# Patient Record
Sex: MALE | Race: WHITE | NOT HISPANIC OR LATINO | Employment: UNEMPLOYED | URBAN - METROPOLITAN AREA
[De-identification: names, ages, dates, MRNs, and addresses within clinical notes are randomized per-mention and may not be internally consistent; named-entity substitution may affect disease eponyms.]

---

## 2018-02-19 ENCOUNTER — TELEPHONE (OUTPATIENT)
Dept: TRANSPLANT | Facility: CLINIC | Age: 48
End: 2018-02-19

## 2018-02-19 NOTE — TELEPHONE ENCOUNTER
----- Message from Jacqueline Dunne sent at 2/19/2018  9:50 AM CST -----  We have the pt recorders, but there are no labs. Ref Md office is in Care Everywhere: Gastroenterology - [facility]    By:Jacqueline Dunne

## 2018-02-20 ENCOUNTER — DOCUMENTATION ONLY (OUTPATIENT)
Dept: TRANSPLANT | Facility: CLINIC | Age: 48
End: 2018-02-20

## 2018-02-20 NOTE — LETTER
February 20, 2018    Doni Flores  [address] [facility] [place] [address]      Dear Doni Flores:    Your doctor has referred you to the [facility]. You will be contacted by our office and an initial appointment will then be scheduled for you.    We look forward to seeing you soon. If you have any further questions, please contact us at [phone].       Sincerely,        [facility]   [address] [place] [address]  [phone]

## 2018-02-20 NOTE — NURSING
Pt records reviewed.  Pt will be referred to Hepatology due to Oliveros Cirr with MELD 7. ? Possible TIPS   Initial referral received  from Barb Baker NP  Referral letter sent to provider and patient.  Pt records reviewed.

## 2018-02-20 NOTE — LETTER
February 20, 2018    Barb Gu, LÁZARO  [address] [facility] [place] [address]      Dear Dr. Gu    Patient: Doni Flores   MR Number: [id number]   YOB: 1970     Thank you for the referral of Doni Flores to the [facility]. An initial appointment will be scheduled for your patient with one of our Hepatologists.      Thank you again for your trust in our program.  If there is anything we can do for you or your staff, please feel free to contact us.        Sincerely,        [facility]  [address] [place] [address]  [phone]

## 2018-03-06 ENCOUNTER — OFFICE VISIT (OUTPATIENT)
Dept: HEPATOLOGY | Facility: CLINIC | Age: 48
End: 2018-03-06
Payer: MEDICARE

## 2018-03-06 VITALS
WEIGHT: 243.81 LBS | DIASTOLIC BLOOD PRESSURE: 67 MMHG | TEMPERATURE: 97 F | SYSTOLIC BLOOD PRESSURE: 151 MMHG | HEIGHT: 74 IN | HEART RATE: 87 BPM | RESPIRATION RATE: 18 BRPM | OXYGEN SATURATION: 98 % | BODY MASS INDEX: 31.29 KG/M2

## 2018-03-06 DIAGNOSIS — K75.81 LIVER CIRRHOSIS SECONDARY TO NASH: Primary | ICD-10-CM

## 2018-03-06 DIAGNOSIS — K76.82 HEPATIC ENCEPHALOPATHY: ICD-10-CM

## 2018-03-06 DIAGNOSIS — K74.60 LIVER CIRRHOSIS SECONDARY TO NASH: Primary | ICD-10-CM

## 2018-03-06 PROBLEM — I50.32 CHRONIC DIASTOLIC HEART FAILURE: Status: ACTIVE | Noted: 2017-07-10

## 2018-03-06 PROBLEM — G89.4 CHRONIC PAIN SYNDROME: Status: ACTIVE | Noted: 2017-09-19

## 2018-03-06 PROBLEM — I48.0 PAROXYSMAL ATRIAL FIBRILLATION: Status: ACTIVE | Noted: 2017-03-20

## 2018-03-06 PROBLEM — D64.9 ANEMIA: Status: ACTIVE | Noted: 2018-01-16

## 2018-03-06 PROBLEM — G93.40 ENCEPHALOPATHY: Status: ACTIVE | Noted: 2017-07-26

## 2018-03-06 PROBLEM — I85.00 ESOPHAGEAL VARICES: Status: ACTIVE | Noted: 2017-02-12

## 2018-03-06 PROCEDURE — 99999 PR PBB SHADOW E&M-EST. PATIENT-LVL IV: CPT | Mod: PBBFAC,,, | Performed by: INTERNAL MEDICINE

## 2018-03-06 PROCEDURE — 99205 OFFICE O/P NEW HI 60 MIN: CPT | Mod: S$PBB,,, | Performed by: INTERNAL MEDICINE

## 2018-03-06 PROCEDURE — 99214 OFFICE O/P EST MOD 30 MIN: CPT | Mod: PBBFAC | Performed by: INTERNAL MEDICINE

## 2018-03-06 RX ORDER — ONDANSETRON 4 MG/1
8 TABLET, FILM COATED ORAL EVERY 8 HOURS PRN
COMMUNITY
End: 2018-10-24

## 2018-03-06 RX ORDER — LEVOTHYROXINE SODIUM 100 UG/1
100 TABLET ORAL DAILY
COMMUNITY

## 2018-03-06 RX ORDER — POTASSIUM CHLORIDE 750 MG/1
20 TABLET, EXTENDED RELEASE ORAL 2 TIMES DAILY
COMMUNITY

## 2018-03-06 RX ORDER — PANTOPRAZOLE SODIUM 40 MG/1
40 TABLET, DELAYED RELEASE ORAL DAILY
COMMUNITY

## 2018-03-06 RX ORDER — DULOXETIN HYDROCHLORIDE 30 MG/1
30 CAPSULE, DELAYED RELEASE ORAL 2 TIMES DAILY
COMMUNITY

## 2018-03-06 RX ORDER — LANOLIN ALCOHOL/MO/W.PET/CERES
400 CREAM (GRAM) TOPICAL DAILY
COMMUNITY

## 2018-03-06 RX ORDER — FLECAINIDE ACETATE 50 MG/1
100 TABLET ORAL EVERY 12 HOURS
COMMUNITY

## 2018-03-06 RX ORDER — LISINOPRIL 10 MG/1
10 TABLET ORAL DAILY
COMMUNITY

## 2018-03-06 RX ORDER — FUROSEMIDE 40 MG/1
40 TABLET ORAL DAILY
Qty: 30 TABLET | Refills: 11 | Status: SHIPPED | OUTPATIENT
Start: 2018-03-06 | End: 2019-03-06

## 2018-03-06 RX ORDER — LANOLIN ALCOHOL/MO/W.PET/CERES
100 CREAM (GRAM) TOPICAL DAILY
COMMUNITY

## 2018-03-06 RX ORDER — FAMOTIDINE 40 MG/1
40 TABLET, FILM COATED ORAL DAILY
COMMUNITY

## 2018-03-06 RX ORDER — ZOLPIDEM TARTRATE 10 MG/1
5 TABLET ORAL NIGHTLY PRN
COMMUNITY
End: 2018-10-24

## 2018-03-06 RX ORDER — SPIRONOLACTONE 100 MG/1
100 TABLET, FILM COATED ORAL DAILY
Qty: 30 TABLET | Refills: 11 | Status: SHIPPED | OUTPATIENT
Start: 2018-03-06 | End: 2019-03-06

## 2018-03-06 RX ORDER — FUROSEMIDE 20 MG/1
20 TABLET ORAL DAILY
COMMUNITY
End: 2018-03-06 | Stop reason: SDUPTHER

## 2018-03-06 RX ORDER — METOPROLOL SUCCINATE 100 MG/1
100 TABLET, EXTENDED RELEASE ORAL DAILY
COMMUNITY

## 2018-03-06 RX ORDER — TIZANIDINE 4 MG/1
4 TABLET ORAL EVERY 8 HOURS
COMMUNITY

## 2018-03-06 RX ORDER — OXYCODONE HCL 10 MG/1
10 TABLET, FILM COATED, EXTENDED RELEASE ORAL EVERY 12 HOURS PRN
COMMUNITY

## 2018-03-06 RX ORDER — FERROUS SULFATE 324(65)MG
325 TABLET, DELAYED RELEASE (ENTERIC COATED) ORAL 2 TIMES DAILY
COMMUNITY

## 2018-03-06 RX ORDER — GABAPENTIN 800 MG/1
800 TABLET ORAL 3 TIMES DAILY
COMMUNITY

## 2018-03-06 NOTE — LETTER
March 6, 2018      Barb Gu NP at [facility] - Hepatology  [address]  [place] [address]  Phone: [phone]  Fax: [phone]          Patient: Doni Flores   MR Number: [id number]   YOB: 1970   Date of Visit: 3/6/2018       Dear Barb Gu:    Thank you for referring Doni Flores to me for evaluation. Attached you will find relevant portions of my assessment and plan of care.    If you have questions, please do not hesitate to call me. I look forward to following Doni Flores along with you.    Sincerely,    Celestina Baires MD    Enclosure  CC:  No Recipients    If you would like to receive this communication electronically, please contact [email or web address] or [phone] to request more information on [employer] Link access.    For providers and/or their staff who would like to refer a patient to [facility], please contact us through our one-stop-shop provider referral line, [facility], at [phone].    If you feel you have received this communication in error or would no longer like to receive these types of communications, please e-mail [email or web address]

## 2018-03-06 NOTE — PATIENT INSTRUCTIONS
You are not a good candidate for TIPS because of confusion.  We will proceed with transplant evaluation at this time.    We will ask for financial approval for transplant.      You should only have TIPS if you have bleeding that cannot be stopped.  This would be an emergency procedure.    Recommend establishing care for pain management.     Increase lasix 40mg daily (you may take two 20mg tablets until you run out).  And we will add spironolactone.  When you start spironolactone, you can stop potassium supplements.      Return to 3 months

## 2018-03-06 NOTE — PROGRESS NOTES
Hepatology Consult Note    Referring provider: Barb Gu    Chief complaint:   Chief Complaint   Patient presents with    Cirrhosis       HPI:  Doni Flores is a 47 y.o. male that presents to hepatology clinic for consultation of HACKETT cirrhosis and consideration of TIPS.  He is alone.    The patient reports being diagnosed with liver disease many years ago but does not recall symptoms or testing that prompted diagnosis.  He believes he has been diagnosed with cirrhosis for approximately 15 years.  Outside records report presumed HACKETT cirrhosis based on risk factors.  States that he was previously 365 lbs but has lost 115 lbs over the last 6 months.      The patient has had multiple hospitalizations related to liver disease and complications of portal hypertension.  He has had variceal and portal hypertensive bleeding that have required multiple blood transfusions.  The patient reports 39 units of blood over the past year.  Care Everywhere reviewed and there is recurring admissions for GI bleeding.  The patient also has HE and most recently was hospitalized 2 weeks ago for encephalopathy.  He was treated with lactulose and rifaximin.  He reports taking lactulose for 2-3 bowel movements daily but is not currently on rifaximin.  Volume overload with LE edema but only on furosemide 20mg daily with potassium supplementation.  He is not aware of being on higher doses of diuretic therapy or aldactone in the past. The patient reports compliance with low sodium diet.      The patient's liver disease has severely negatively impacted his life.  He is currently living with his brother and mother.  He has had many hospitalizations over the last year, with at least one per month.      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    Severe episode of recurrent major depressive disorder    Mixed hyperlipidemia    Pancytopenia    Paroxysmal atrial fibrillation    Peripheral neuropathy    Primary osteoarthritis involving multiple joints    Poorly controlled type 2 diabetes mellitus with peripheral neuropathy       PMH:    CAD - reported 20% LAD lesion, normal stress test 2017  Depression  DM - about 20 years   Prior obesity (weight up to 365 lbs)  GERD  Hypothyroid  Depression   Opioid abuse - previously seen by pain management but not currently on medication  Previously on suboxone   Atrial fibrillation (prior cardioversion x 1), not currently in a fib; stopped anticoagulation due to recurrent GI bleeding   Peripheral neuropathy    PSH:   Carpal tunnel   Back surgery  Foot surgery  Leg surgery (infected with woundvac)  Cholecystectomy - open     FH: no liver disease     Social History     Social History    Marital status:      Spouse name: N/A    Number of children: N/A    Years of education: N/A     Social History Main Topics    Smoking status: Former Smoker     Types: Cigarettes     Quit date: 3/6/1998    Smokeless tobacco: None    Alcohol use No    Drug use: Unknown    Sexual activity: Not Asked     Other Topics Concern    None     Social History Narrative    None   lives with mother and brother; quit smoking in 1997, quit alcohol 15-20 years ago when diagnosed with liver disease  No illicit drug use     Current Outpatient Prescriptions   Medication Sig Dispense Refill    cyanocobalamin (VITAMIN B-12) 1000 MCG tablet Take 100 mcg by mouth once daily.      DULoxetine (CYMBALTA) 30 MG capsule Take 30 mg by mouth 2 (two) times daily.      famotidine (PEPCID) 40 MG tablet Take 40 mg by mouth once daily.      ferrous sulfate 324 mg (65 mg iron) TbEC Take 325 mg by  mouth 2 (two) times daily.      flecainide (TAMBOCOR) 50 MG Tab Take 100 mg by mouth every 12 (twelve) hours.      furosemide (LASIX) 20 MG tablet Take 20 mg by mouth once daily.      gabapentin (NEURONTIN) 800 MG tablet Take 800 mg by mouth 3 (three) times daily.      insulin NPH-insulin regular, 70/30, (NOVOLIN 70/30) 100 unit/mL (70-30) injection Inject into the skin 2 (two) times daily. 30 units nightly      levothyroxine (SYNTHROID) 100 MCG tablet Take 100 mcg by mouth once daily.      lisinopril 10 MG tablet Take 10 mg by mouth once daily.      magnesium oxide (MAG-OX) 400 mg tablet Take 400 mg by mouth once daily.      metoprolol succinate (TOPROL-XL) 100 MG 24 hr tablet Take 100 mg by mouth once daily.      ondansetron (ZOFRAN) 4 MG tablet Take 8 mg by mouth every 8 (eight) hours as needed for Nausea.      oxyCODONE (OXYCONTIN) 10 mg 12 hr tablet Take 10 mg by mouth every 12 (twelve) hours as needed for Pain.      pantoprazole (PROTONIX) 40 MG tablet Take 40 mg by mouth once daily.      potassium chloride SA (K-DUR,KLOR-CON) 10 MEQ tablet Take 20 mEq by mouth 2 (two) times daily.      tiZANidine (ZANAFLEX) 4 MG tablet Take 4 mg by mouth every 8 (eight) hours.      zolpidem (AMBIEN) 10 mg Tab Take 5 mg by mouth nightly as needed.       No current facility-administered medications for this visit.        Review of patient's allergies indicates:   Allergen Reactions    Ketorolac tromethamine Palpitations    Tramadol hcl Hives    Cyclobenzaprine hcl Other (See Comments)     Unable to void    Ibuprofen Itching       Review of Systems   Constitutional: Positive for malaise/fatigue and weight loss. Negative for chills and fever.   Eyes: Negative.    Respiratory: Negative for cough and shortness of breath.    Cardiovascular: Positive for leg swelling. Negative for chest pain.   Gastrointestinal: Positive for heartburn and nausea. Negative for abdominal pain and vomiting.   Musculoskeletal: Positive  "for back pain and joint pain. Negative for myalgias.   Skin: Negative for itching and rash.   Neurological: Positive for weakness. Negative for dizziness, focal weakness and headaches.   Endo/Heme/Allergies: Does not bruise/bleed easily.   Psychiatric/Behavioral: Positive for depression and memory loss.       Vitals:    03/06/18 1358   BP: (!) 151/67   Pulse: 87   Resp: 18   Temp: 96.9 °F (36.1 °C)   TempSrc: Oral   SpO2: 98%   Weight: 110.6 kg (243 lb 13.3 oz)   Height: 6' 2" (1.88 m)       Physical Exam   Constitutional: He is oriented to person, place, and time. He appears well-developed and well-nourished. No distress.   HENT:   Head: Normocephalic and atraumatic.   Mouth/Throat: Oropharynx is clear and moist. No oropharyngeal exudate.   Eyes: Conjunctivae are normal. Pupils are equal, round, and reactive to light. No scleral icterus.   Neck: Normal range of motion. Neck supple. No thyromegaly present.   Cardiovascular: Normal rate, regular rhythm and normal heart sounds.  Exam reveals no gallop and no friction rub.    No murmur heard.  No evidence of a fib   Pulmonary/Chest: Effort normal and breath sounds normal. No respiratory distress. He has no wheezes. He has no rales.   Abdominal: Soft. Bowel sounds are normal. He exhibits no distension. There is tenderness.   Musculoskeletal: Normal range of motion. He exhibits edema (2+ pitting edema).   Lymphadenopathy:     He has no cervical adenopathy.   Neurological: He is alert and oriented to person, place, and time. No cranial nerve deficit.   Asterixis present   Skin: Skin is warm and dry. No erythema.   Psychiatric: He has a normal mood and affect. His behavior is normal.   Slowed speech and mentation   Vitals reviewed.    Labs:    External labs reviewed in Care Everywhere, MELD 7    Imaging: External imaging reviewed; contrasted CT in August and October 2017     Assessment:  47 y.o. male presenting with decompensated HACKETT cirrhosis characterized by recurrent " GI bleeding, HE and ascites formation.  Consultation for consideration of TIPS.      Recurrent GI bleeding:  The patient is not a good candidate for TIPS based on presence of HE.  The patient has required recent hospitalization for HE and is currently on lactulose for management.  He was also treated with rifaximin while inpatient but reports that he is not currently on the medication.  Will order through [facility] to obtain PA and possibly patient assistance if required.  TIPS will make HE much worse and therefore would only pursue if patient presented with portal hypertensive bleeding that could not be managed endoscopically.      Volume overload:  Patient is on low dose diuretics.  Increase furosemide to 40mg daily and discontinue potassium supplementation.  He should initiate spironolactone 100mg daily.  Repeat labs locally in 2 weeks to f/u electrolytes and renal function with dose change.  Patient encouraged to continue low sodium diet.    Transplant candidacy:  Based on frequent hospitalizations and inability to treat recurrent bleeding with TIPS due to HE, recommend initiating transplant evaluation.  The patient has no absolute contraindications but is noted to have multiple referrals to opiate and benzo abuse along with drug seeking behavior.  He did also ask for narcotic pain medications during his office visit, which was not given.  The patient will need addiction psychiatry evaluation along with initiation of external pain management with transplant evaluation.  If patient is deemed an appropriate candidate, he will require exception points based on frequent hospitalizations and life-threatening bleeding despite appropriate medical management.       RTC in 3 months with transplant evaluation in the interim and repeat labs in 2 weeks

## 2018-03-07 ENCOUNTER — TELEPHONE (OUTPATIENT)
Dept: TRANSPLANT | Facility: CLINIC | Age: 48
End: 2018-03-07

## 2018-03-07 NOTE — TELEPHONE ENCOUNTER
Initial referral received via fax from  Barb Baker NP  Patient with HACKETT cirrhosis. Based on frequent hospitalizations and inability to treat recurrent bleeding with TIPS due to HE, recommend initiating transplant evaluation.    MELD 7  Referred for liver transplant for EVALUATION.    Referral completed and forwarded to Transplant Financial Services.      Insurance: EPIC  Contact #

## 2018-03-13 ENCOUNTER — TELEPHONE (OUTPATIENT)
Dept: TRANSPLANT | Facility: CLINIC | Age: 48
End: 2018-03-13

## 2018-03-13 NOTE — TELEPHONE ENCOUNTER
Pt informed of clr and need for care giver at all appts. Pt instructed Rhoda will call him within 1-2 weeks for appt.

## 2018-03-16 ENCOUNTER — TELEPHONE (OUTPATIENT)
Dept: PHARMACY | Facility: CLINIC | Age: 48
End: 2018-03-16

## 2018-03-22 DIAGNOSIS — K76.9 LIVER DISEASE: Primary | ICD-10-CM

## 2018-03-23 ENCOUNTER — TELEPHONE (OUTPATIENT)
Dept: TRANSPLANT | Facility: CLINIC | Age: 48
End: 2018-03-23

## 2018-03-26 ENCOUNTER — TELEPHONE (OUTPATIENT)
Dept: HEPATOLOGY | Facility: CLINIC | Age: 48
End: 2018-03-26

## 2018-03-26 DIAGNOSIS — K75.81 LIVER CIRRHOSIS SECONDARY TO NASH: Primary | ICD-10-CM

## 2018-03-26 DIAGNOSIS — Z76.82 AWAITING ORGAN TRANSPLANT STATUS: ICD-10-CM

## 2018-03-26 DIAGNOSIS — K74.60 LIVER CIRRHOSIS SECONDARY TO NASH: Primary | ICD-10-CM

## 2018-03-26 DIAGNOSIS — K76.82 HEPATIC ENCEPHALOPATHY: ICD-10-CM

## 2018-03-26 NOTE — TELEPHONE ENCOUNTER
Patient contacted coordinator on Thursday March 22nd for urgent appointment.  Reported severe abdominal pain and requests for pain medication.  Through discussion with patient's sister, informed that the patient has had a recent hospitalization for AMS in the setting of using non-prescribed medications including narcotics.  Found to have a supply of non-prescribed medications in his room and there is significant concern regarding abuse of these medications.  This has not been addressed with patient and will need to be seen in clinic for further evaluation and discussion.    If confirmed that the patient is using non-prescribed medications and exhibiting narcotic abuse, not appropriate to move forward with transplant evaluation at this time.  He will need psychiatry and rehab services prior to proceeding.

## 2018-03-28 NOTE — TELEPHONE ENCOUNTER
Received phone call from patient's sister, Altagracia Castañeda ([phone]).  She reports that patient has been admitted twice locally recently.  She reports that patient was in [facility] in [place].....in ICU x2 days.  Patient admitted for HE.  Patient dc'd from the hospital within a few days.  Patient was doing fair but within a week or so after dc, patient became confused.  Family thought patient was encephalopathic so instructed him to take lactulose.  Patient's symptoms worsened throughout the day and patient became unresponsive.  Patient taken to [facility].  While patient in hospital, the family found prescribed medications that patient bought from the street, including pain pills, xanax, and other unidentified pills.  Sister also noted that patient is overusing muscle relaxers.  She reports that she called to inform us above because she feels as though patient will not likely be honest w/ transplant dept.  Will inform pt's hepatologist of above  ---------------------------------------------------------------------------------  3/22/18  Received phone call from patient.  Patient dc'd from the hospital.  He is calling w/ c/o severe abdominal pain.  Denies any other symptoms.  Instructed patient to report to ED.  He reports that he has been seen in ED but has not been given any pain pills.  Patient requesting that MD here call in a refill for pain.  Informed patient that this will not be done.  Informed patient that he must first be assessed in clinic and is likely that pain medication will still not be prescribed.  He voiced understanding but requests a f/u visit.  Per his request, urgent appt scheduled for 3/23 in the transplant clinic.  Instructed patient to report to ED for worsening pain.  ---------------------------------------------------------------------------------------  3/23/18  Patient no-showed for clinic appt.  Attempted to call patient but no answer at this  time.  ---------------------------------------------------------------------------------------  3/28/18  Called patient to discuss missed appt.  He reports that he did not come in for scheduled appts d/t no transportation.  He reports that he has since rescheduled his appts and has his transportation coordinated.  He reports that he continues to have pain but describes it as chronic.  Once again, instructed him to report to his local ED for worsening pain.  He voiced understanding.

## 2018-04-11 ENCOUNTER — TELEPHONE (OUTPATIENT)
Dept: TRANSPLANT | Facility: CLINIC | Age: 48
End: 2018-04-11

## 2018-04-11 NOTE — TELEPHONE ENCOUNTER
----- Message from Jacqueline Dunne sent at 4/11/2018  9:14 AM CDT -----  Contact: Pt  Returned pt call and told him Dr. Baires next available appt is May 10. Re/[facility] pt appts for may 10 and will mail him out a new appt slip.  ----- Message -----  From: Mikey Chua  Sent: 4/11/2018   8:35 AM  To: [facility] Pre-Liver Transplant Non-Clinical    Pt unable to make appt, he would like to come in next Thursday or Friday.     Call

## 2018-05-10 ENCOUNTER — OFFICE VISIT (OUTPATIENT)
Dept: TRANSPLANT | Facility: CLINIC | Age: 48
End: 2018-05-10
Payer: MEDICARE

## 2018-05-10 ENCOUNTER — LAB VISIT (OUTPATIENT)
Dept: LAB | Facility: HOSPITAL | Age: 48
End: 2018-05-10
Attending: INTERNAL MEDICINE
Payer: MEDICARE

## 2018-05-10 ENCOUNTER — TELEPHONE (OUTPATIENT)
Dept: TRANSPLANT | Facility: CLINIC | Age: 48
End: 2018-05-10

## 2018-05-10 VITALS
HEIGHT: 72 IN | BODY MASS INDEX: 31.36 KG/M2 | RESPIRATION RATE: 16 BRPM | OXYGEN SATURATION: 100 % | HEART RATE: 64 BPM | DIASTOLIC BLOOD PRESSURE: 67 MMHG | TEMPERATURE: 98 F | WEIGHT: 231.5 LBS | SYSTOLIC BLOOD PRESSURE: 116 MMHG

## 2018-05-10 DIAGNOSIS — Z76.82 AWAITING ORGAN TRANSPLANT STATUS: ICD-10-CM

## 2018-05-10 DIAGNOSIS — K74.60 LIVER CIRRHOSIS SECONDARY TO NASH: ICD-10-CM

## 2018-05-10 DIAGNOSIS — K75.81 LIVER CIRRHOSIS SECONDARY TO NASH: ICD-10-CM

## 2018-05-10 DIAGNOSIS — Z76.82 ORGAN TRANSPLANT CANDIDATE: Primary | ICD-10-CM

## 2018-05-10 LAB
ALBUMIN SERPL BCP-MCNC: 3.2 G/DL
ALP SERPL-CCNC: 216 U/L
ALT SERPL W/O P-5'-P-CCNC: 18 U/L
ANION GAP SERPL CALC-SCNC: 11 MMOL/L
AST SERPL-CCNC: 26 U/L
BASOPHILS # BLD AUTO: 0.01 K/UL
BASOPHILS NFR BLD: 0.5 %
BILIRUB SERPL-MCNC: 0.4 MG/DL
BUN SERPL-MCNC: 21 MG/DL
CALCIUM SERPL-MCNC: 9.6 MG/DL
CHLORIDE SERPL-SCNC: 93 MMOL/L
CO2 SERPL-SCNC: 24 MMOL/L
CREAT SERPL-MCNC: 1.3 MG/DL
DIFFERENTIAL METHOD: ABNORMAL
EOSINOPHIL # BLD AUTO: 0.1 K/UL
EOSINOPHIL NFR BLD: 5 %
ERYTHROCYTE [DISTWIDTH] IN BLOOD BY AUTOMATED COUNT: 15.7 %
EST. GFR  (AFRICAN AMERICAN): >60 ML/MIN/1.73 M^2
EST. GFR  (NON AFRICAN AMERICAN): >60 ML/MIN/1.73 M^2
GLUCOSE SERPL-MCNC: 378 MG/DL
HCT VFR BLD AUTO: 31.1 %
HGB BLD-MCNC: 9.9 G/DL
IMM GRANULOCYTES # BLD AUTO: 0.01 K/UL
IMM GRANULOCYTES NFR BLD AUTO: 0.5 %
INR PPP: 1
LYMPHOCYTES # BLD AUTO: 0.6 K/UL
LYMPHOCYTES NFR BLD: 27.1 %
MCH RBC QN AUTO: 25.6 PG
MCHC RBC AUTO-ENTMCNC: 31.8 G/DL
MCV RBC AUTO: 80 FL
MONOCYTES # BLD AUTO: 0.2 K/UL
MONOCYTES NFR BLD: 11 %
NEUTROPHILS # BLD AUTO: 1.2 K/UL
NEUTROPHILS NFR BLD: 55.9 %
NRBC BLD-RTO: 0 /100 WBC
PLATELET # BLD AUTO: 93 K/UL
PMV BLD AUTO: 12.1 FL
POTASSIUM SERPL-SCNC: 4.3 MMOL/L
PROT SERPL-MCNC: 7.9 G/DL
PROTHROMBIN TIME: 10.9 SEC
RBC # BLD AUTO: 3.87 M/UL
SODIUM SERPL-SCNC: 128 MMOL/L
WBC # BLD AUTO: 2.18 K/UL

## 2018-05-10 PROCEDURE — 85025 COMPLETE CBC W/AUTO DIFF WBC: CPT | Mod: TXP

## 2018-05-10 PROCEDURE — 85610 PROTHROMBIN TIME: CPT | Mod: TXP

## 2018-05-10 PROCEDURE — 99999 PR PBB SHADOW E&M-EST. PATIENT-LVL V: CPT | Mod: PBBFAC,TXP,, | Performed by: INTERNAL MEDICINE

## 2018-05-10 PROCEDURE — 80053 COMPREHEN METABOLIC PANEL: CPT | Mod: TXP

## 2018-05-10 PROCEDURE — 36415 COLL VENOUS BLD VENIPUNCTURE: CPT | Mod: TXP

## 2018-05-10 PROCEDURE — 99215 OFFICE O/P EST HI 40 MIN: CPT | Mod: S$PBB,TXP,, | Performed by: INTERNAL MEDICINE

## 2018-05-10 PROCEDURE — 99215 OFFICE O/P EST HI 40 MIN: CPT | Mod: PBBFAC,TXP | Performed by: INTERNAL MEDICINE

## 2018-05-10 RX ORDER — INSULIN ASPART 100 [IU]/ML
35 INJECTION, SOLUTION INTRAVENOUS; SUBCUTANEOUS 3 TIMES DAILY
COMMUNITY
Start: 2018-01-18

## 2018-05-10 RX ORDER — LACTULOSE 10 G/15ML
30 SOLUTION ORAL 3 TIMES DAILY
COMMUNITY
Start: 2018-03-14

## 2018-05-10 NOTE — PATIENT INSTRUCTIONS
We can not proceed with liver transplant at this time.    It is important to establish care with a psychiatrist and a pain management specialist.    You would benefit from outpatient physical therapy.    Important to have better control of blood sugar.  You should work with your primary care doctor.    It is important to limit fluid intake to less than 2 liters per day.      You will return for hepatology follow-up in 3 months.

## 2018-05-10 NOTE — TELEPHONE ENCOUNTER
----- Message from Fabiola Oliva LCSW sent at 5/10/2018  2:31 PM CDT -----  This patient does not have a working number. I will mail him a letter with recommended resources. I will state in the letter that patient will need to complete substance abuse treatment and follow up with aftercare in order to be considered for liver transplant eval.     Thanks.   ----- Message -----  From: Celestina Baires MD  Sent: 5/10/2018   1:10 PM  To: [facility] Trans[facility] Social Workers    This patient is being declined for transplant due to drug seeking behavior and narcotic abuse.  Can we provide local addiction psychiatry resources?  I'm also asking the patient's PCP to coordinate pain management.      Thanks, CB

## 2018-05-10 NOTE — Clinical Note
This patient is being declined for transplant due to drug seeking behavior and narcotic abuse.  Can we provide local addiction psychiatry resources?  I'm also asking the patient's PCP to coordinate pain management.    Thanks, CB

## 2018-05-10 NOTE — PROGRESS NOTES
Transplant Hepatology  Liver Transplant Recipient Evaluation      Consultation started: 5/10/2018 at 10:55 AM     Original Referring Provider: Barb Gu  Current Corresponding Physician: Barb PARSON Native Liver Diagnosis: Cirrhosis: Fatty Liver (Oliveros)    Reason for Visit: evaluation for liver transplant     Subjective:     Doni Flores is a 47 y.o. male with ESLD secondary to OLIVEROS (non-alcoholic steatohepatitis).  He is alone.     Last office visit of 3/6/2018.  The patient has had multiple local hospitalizations since his last office visit.  He reports that they are due to recurrent GI bleeding and volume overload. External records reviewed and the patient appears to have 4 documented hospitalizations in Care Everywhere.  Most recent from 4/13/18 is hematemesis related to variceal bleeding requiring intubation.  The patient had a hemoglobin down to 6.8.  Received blood transfusion along with endoscopy and banding.  The patient has also had ED visits with documentation of no significant biochemical or clinical abnormalities with drug seeking behavior.       The patient states that he continues to have issues with intermittent GI bleeding, HE, and volume overload.  He reports taking lasix 40mg and spironolactone 100mg daily.  He is also on lactulose and rifaximin.    The patient is known to have been taking non-prescribed medications as well as seeking narcotics from multiple providers.  This was discussed today.      Review of Systems   Constitutional: Positive for fatigue. Negative for activity change, appetite change, chills and unexpected weight change.   HENT: Negative for hearing loss and sore throat.    Eyes: Negative for visual disturbance.   Respiratory: Negative for shortness of breath.    Cardiovascular: Negative for chest pain.   Gastrointestinal: Negative for abdominal distention, abdominal pain, nausea and vomiting.   Musculoskeletal: Positive for back pain. Negative for gait  problem.   Skin: Negative for rash.   Neurological: Negative for weakness and headaches.   Hematological: Negative for adenopathy. Does not bruise/bleed easily.   Psychiatric/Behavioral: Positive for confusion.       Objective:   Physical Exam     MELD-Na score: 9 at 5/10/2018  8:50 AM  MELD score: 9 at 5/10/2018  8:50 AM  Calculated from:  Serum Creatinine: 1.3 mg/dL at 5/10/2018  8:50 AM  Serum Sodium: 128 mmol/L at 5/10/2018  8:50 AM  Total Bilirubin: 0.4 mg/dL (Rounded to 1) at 5/10/2018  8:50 AM  INR(ratio): 1 at 5/10/2018  8:50 AM  Age: 47 years     Lab Results   Component Value Date     (H) 05/10/2018    BUN 21 (H) 05/10/2018    CREATININE 1.3 05/10/2018    CALCIUM 9.6 05/10/2018     (L) 05/10/2018    K 4.3 05/10/2018    CL 93 (L) 05/10/2018    PROT 7.9 05/10/2018    CO2 24 05/10/2018    WBC 2.18 (L) 05/10/2018    RBC 3.87 (L) 05/10/2018    HGB 9.9 (L) 05/10/2018    HCT 31.1 (L) 05/10/2018    PLT 93 (L) 05/10/2018     Lab Results   Component Value Date    ALBUMIN 3.2 (L) 05/10/2018    BILITOT 0.4 05/10/2018    AST 26 05/10/2018    ALT 18 05/10/2018    ALKPHOS 216 (H) 05/10/2018    LABPROT 10.9 05/10/2018    INR 1.0 05/10/2018       Diagnostics: EMR reviewed     Transplant Hepatology - Candidacy   Assessment/Plan:     Transplant Candidacy: Doni Flores is a 47 y.o. male with ESLD secondary to nonalcoholic steatohepatitis here for evaluation for possible OLT.  In my opinion, he is not a good candidate for liver transplant.  He will not be presented to selection committee after all tests and evaluations are complete.    While the patient has objective evidence of decompensated liver disease, the patient is showing escalation of drug-seeking behavior and has not been honest about use.  This has been directly confirmed as previously documented.  Therefore we cannot proceed with transplant evaluation.  This was explicitly discussed and patient was recommended to engage with addiction psychiatry and pain  management locally to address this issue.  Only once there has been clear documentation that the patient has actively engaged in services and is compliant with recommendations can reconsideration of transplant evaluation occur.    Will ask social work to provide resources for local psychiatry.  He reports that phone number needs to be updated and he will contact transplant office with new cell phone number.      Patient instructed to f/u with PCP regarding pain management and blood glucose management.      Hyponatremia:  1.5-2L fluid restriction, continue current dosing of diuretics.  Continue low sodium diet for volume overload.     Decreased functional status:  Patient would benefit from local HH or outpatient PT.      PCP: Dr. Erik Baires MD         [facility] Patient Status  Functional Status: 50% - Requires considerable assistance and frequent medical care  Physical Capacity: Limited Mobility    Outside Records Request: none

## 2018-05-10 NOTE — LETTER
May 10, 2018        Barb Gu  [address]  [facility] [place] [address]  Phone: [phone]  Fax: [phone]             [facility]  [address]  [facility] [address]  Phone: [phone]   Patient: Doni Flores   MR Number: [id number]   YOB: 1970   Date of Visit: 5/10/2018       Dear Dr. Barb Gu    Thank you for referring Doni Flores to me for evaluation. Attached you will find relevant portions of my assessment and plan of care.    If you have questions, please do not hesitate to call me. I look forward to following Doni Flores along with you.    Sincerely,    Celestina Baires MD    Enclosure    If you would like to receive this communication electronically, please contact [email or web address] or [phone] to request [employer] access.    [employer] Link is a tool which provides read-only access to select patient information with whom you have a relationship. Its easy to use and provides real time access to review your patients record including encounter summaries, notes, results, and demographic information.    If you feel you have received this communication in error or would no longer like to receive these types of communications, please e-mail [email or web address]

## 2018-05-10 NOTE — TELEPHONE ENCOUNTER
SW attempted to reach out to patient via his cell phone ([phone]) but was unable to reach him as this is not a working number. LINDA emailed letter to pt's address stating that pt will need to complete formal substance abuse treatment and enroll in aftercare to address substance abuse. LINDA provided three local resources in the letter. SW include name and contact number in letter in case pt has any follow up questions. SW remains available.

## 2018-05-15 NOTE — PROGRESS NOTES
Patient declined for liver transplant d/t ongoing narcotic abuse.  [place] transplant episode resolved.  Patient to start rehab/ therapy and RTC in three months to be seen by Dr. Baires in hepatology clinic.  Hepatology staff notified and recall entered.

## 2018-07-26 DIAGNOSIS — K75.81 LIVER CIRRHOSIS SECONDARY TO NASH: Primary | ICD-10-CM

## 2018-07-26 DIAGNOSIS — K74.60 LIVER CIRRHOSIS SECONDARY TO NASH: Primary | ICD-10-CM

## 2018-10-24 ENCOUNTER — OFFICE VISIT (OUTPATIENT)
Dept: HEPATOLOGY | Facility: CLINIC | Age: 48
End: 2018-10-24
Payer: MEDICARE

## 2018-10-24 ENCOUNTER — TELEPHONE (OUTPATIENT)
Dept: HEPATOLOGY | Facility: CLINIC | Age: 48
End: 2018-10-24

## 2018-10-24 ENCOUNTER — LAB VISIT (OUTPATIENT)
Dept: LAB | Facility: HOSPITAL | Age: 48
End: 2018-10-24
Attending: INTERNAL MEDICINE
Payer: MEDICARE

## 2018-10-24 VITALS
WEIGHT: 231.5 LBS | DIASTOLIC BLOOD PRESSURE: 91 MMHG | HEART RATE: 105 BPM | HEIGHT: 74 IN | BODY MASS INDEX: 29.71 KG/M2 | RESPIRATION RATE: 18 BRPM | SYSTOLIC BLOOD PRESSURE: 194 MMHG | TEMPERATURE: 98 F | OXYGEN SATURATION: 98 %

## 2018-10-24 DIAGNOSIS — K74.60 LIVER CIRRHOSIS SECONDARY TO NASH: Primary | ICD-10-CM

## 2018-10-24 DIAGNOSIS — R73.9 HYPERGLYCEMIA: ICD-10-CM

## 2018-10-24 DIAGNOSIS — K76.82 HEPATIC ENCEPHALOPATHY: ICD-10-CM

## 2018-10-24 DIAGNOSIS — K75.81 LIVER CIRRHOSIS SECONDARY TO NASH: ICD-10-CM

## 2018-10-24 DIAGNOSIS — K74.60 LIVER CIRRHOSIS SECONDARY TO NASH: ICD-10-CM

## 2018-10-24 DIAGNOSIS — K75.81 LIVER CIRRHOSIS SECONDARY TO NASH: Primary | ICD-10-CM

## 2018-10-24 LAB
AFP SERPL-MCNC: 4.1 NG/ML
ALBUMIN SERPL BCP-MCNC: 3.4 G/DL
ALP SERPL-CCNC: 442 U/L
ALT SERPL W/O P-5'-P-CCNC: 39 U/L
ANION GAP SERPL CALC-SCNC: 13 MMOL/L
AST SERPL-CCNC: 38 U/L
BASOPHILS # BLD AUTO: 0.02 K/UL
BASOPHILS NFR BLD: 0.8 %
BILIRUB SERPL-MCNC: 0.5 MG/DL
BUN SERPL-MCNC: 23 MG/DL
CALCIUM SERPL-MCNC: 9.8 MG/DL
CHLORIDE SERPL-SCNC: 96 MMOL/L
CO2 SERPL-SCNC: 19 MMOL/L
CREAT SERPL-MCNC: 1.6 MG/DL
DIFFERENTIAL METHOD: ABNORMAL
EOSINOPHIL # BLD AUTO: 0 K/UL
EOSINOPHIL NFR BLD: 1.5 %
ERYTHROCYTE [DISTWIDTH] IN BLOOD BY AUTOMATED COUNT: 16.6 %
EST. GFR  (AFRICAN AMERICAN): 57.2 ML/MIN/1.73 M^2
EST. GFR  (NON AFRICAN AMERICAN): 49.5 ML/MIN/1.73 M^2
GLUCOSE SERPL-MCNC: 939 MG/DL
HCT VFR BLD AUTO: 36.2 %
HGB BLD-MCNC: 11.5 G/DL
IMM GRANULOCYTES # BLD AUTO: 0.01 K/UL
IMM GRANULOCYTES NFR BLD AUTO: 0.4 %
INR PPP: 1
LYMPHOCYTES # BLD AUTO: 0.5 K/UL
LYMPHOCYTES NFR BLD: 18.8 %
MCH RBC QN AUTO: 25.9 PG
MCHC RBC AUTO-ENTMCNC: 31.8 G/DL
MCV RBC AUTO: 82 FL
MONOCYTES # BLD AUTO: 0.3 K/UL
MONOCYTES NFR BLD: 10.9 %
NEUTROPHILS # BLD AUTO: 1.8 K/UL
NEUTROPHILS NFR BLD: 67.6 %
NRBC BLD-RTO: 0 /100 WBC
PLATELET # BLD AUTO: 80 K/UL
PMV BLD AUTO: 12.2 FL
POTASSIUM SERPL-SCNC: 5.2 MMOL/L
PROT SERPL-MCNC: 8.6 G/DL
PROTHROMBIN TIME: 10.7 SEC
RBC # BLD AUTO: 4.44 M/UL
SODIUM SERPL-SCNC: 128 MMOL/L
WBC # BLD AUTO: 2.66 K/UL

## 2018-10-24 PROCEDURE — 99215 OFFICE O/P EST HI 40 MIN: CPT | Mod: PBBFAC | Performed by: INTERNAL MEDICINE

## 2018-10-24 PROCEDURE — 36415 COLL VENOUS BLD VENIPUNCTURE: CPT

## 2018-10-24 PROCEDURE — 99215 OFFICE O/P EST HI 40 MIN: CPT | Mod: S$PBB,,, | Performed by: INTERNAL MEDICINE

## 2018-10-24 PROCEDURE — 85610 PROTHROMBIN TIME: CPT

## 2018-10-24 PROCEDURE — 82105 ALPHA-FETOPROTEIN SERUM: CPT

## 2018-10-24 PROCEDURE — 99999 PR PBB SHADOW E&M-EST. PATIENT-LVL V: CPT | Mod: PBBFAC,,, | Performed by: INTERNAL MEDICINE

## 2018-10-24 PROCEDURE — 85025 COMPLETE CBC W/AUTO DIFF WBC: CPT

## 2018-10-24 PROCEDURE — 80053 COMPREHEN METABOLIC PANEL: CPT

## 2018-10-24 NOTE — TELEPHONE ENCOUNTER
Attempted to contact pt to determine if he has already left [facility] to travel home. Pt did not answer and VM is not set up, so unable to leave message for him. Provider notified.    SCC

## 2018-10-24 NOTE — PATIENT INSTRUCTIONS
You have labs pending.  We will contact you with the results.    I will review your records to determine if you are a transplant candidate.    Recommend continuing to follow with pain management.    Return to clinic in 3 months

## 2018-10-24 NOTE — PROGRESS NOTES
Transplant Hepatology  Liver Transplant Recipient Evaluation    Consultation started: 10/24/2018 at 10:55 AM     Original Referring Provider: Barb Gu  Current Corresponding Physician: Barb PARSON Native Liver Diagnosis: Cirrhosis: Fatty Liver (Oliveros)    Reason for Visit: evaluation for liver transplant     Subjective:     Doni Flores is a 48 y.o. male with ESLD secondary to OLIVEROS (non-alcoholic steatohepatitis).  He is alone.     Last office visit of 5/10/2018.  The patient returns for scheduled follow-up.    Th patient was previously referred for consideration of liver transplant evaluation on the basis of multiple hospitalizations for HE, bleeding and other complications of portal hypertension.  Prior to full transplant evaluation, family contacted provider to report that HE was being precipitated by use of non-prescribed medications that were received from unclear source.  The patient has also shown drug seeking behavior with multiple ED visits and requests for narcotics at prior hepatology clinic visits.      The patient returns and reports ongoing clinical decline.  He has had multiple hospitalizations locally for HE, bleeding and uncontrolled BG.  Most recently discharged one week prior to office visit.      He reports that he is following with pain management locally and not being prescribed narcotics but has received narcotic medications with ED and hospitalizations.  He reports most recent use on the day of clinic visit.      Lactulose with 2-3 bowel movements per day; taking rifaximin  Reports fluid accumulation: lasix 40mg and spironolactone 100mg daily    Seeing PCP for diabetes, poorly controlled.  Critical lab called today for .       Not strictly following low sodium diet  Weight stable      Review of Systems   Constitutional: Positive for fatigue. Negative for activity change, appetite change, chills and unexpected weight change.   HENT: Negative for hearing loss and sore  throat.    Eyes: Negative for visual disturbance.   Respiratory: Negative for shortness of breath.    Cardiovascular: Negative for chest pain.   Gastrointestinal: Positive for abdominal pain (RUQ). Negative for abdominal distention, nausea and vomiting.   Musculoskeletal: Positive for back pain. Negative for gait problem.   Skin: Negative for rash.   Neurological: Negative for weakness and headaches.   Hematological: Negative for adenopathy. Does not bruise/bleed easily.   Psychiatric/Behavioral: Positive for confusion.       Objective:   Physical Exam   Constitutional: He is oriented to person, place, and time. He appears well-developed and well-nourished. No distress.   Ambulating with cane   HENT:   Head: Normocephalic and atraumatic.   Mouth/Throat: Oropharynx is clear and moist.   Poor dentition   Eyes: Conjunctivae are normal. Pupils are equal, round, and reactive to light. No scleral icterus.   Neck: Normal range of motion. Neck supple. No thyromegaly present.   Cardiovascular: Normal rate, regular rhythm and normal heart sounds. Exam reveals no gallop and no friction rub.   No murmur heard.  Pulmonary/Chest: Effort normal and breath sounds normal. No respiratory distress. He has no wheezes. He has no rales.   Abdominal: Soft. Bowel sounds are normal. He exhibits no distension. There is no tenderness.   Musculoskeletal: Normal range of motion. He exhibits edema.   Lymphadenopathy:     He has no cervical adenopathy.   Neurological: He is alert and oriented to person, place, and time. No cranial nerve deficit.   Skin: Skin is warm and dry. No erythema.   Psychiatric: He has a normal mood and affect. His behavior is normal.   Vitals reviewed.     MELD-Na score: 11 at 10/24/2018 11:30 AM  MELD score: 11 at 10/24/2018 11:30 AM  Calculated from:  Serum Creatinine: 1.6 mg/dL at 10/24/2018 11:30 AM  Serum Sodium: 128 mmol/L at 10/24/2018 11:30 AM  Total Bilirubin: 0.5 mg/dL (Rounded to 1 mg/dL) at 10/24/2018 11:30  AM  INR(ratio): 1 at 10/24/2018 11:30 AM  Age: 48 years     Lab Results   Component Value Date     (H) 05/10/2018    BUN 21 (H) 05/10/2018    CREATININE 1.3 05/10/2018    CALCIUM 9.6 05/10/2018     (L) 05/10/2018    K 4.3 05/10/2018    CL 93 (L) 05/10/2018    PROT 7.9 05/10/2018    CO2 24 05/10/2018    WBC 2.18 (L) 05/10/2018    RBC 3.87 (L) 05/10/2018    HGB 9.9 (L) 05/10/2018    HCT 31.1 (L) 05/10/2018    PLT 93 (L) 05/10/2018     Lab Results   Component Value Date    ALBUMIN 3.2 (L) 05/10/2018    BILITOT 0.4 05/10/2018    AST 26 05/10/2018    ALT 18 05/10/2018    ALKPHOS 216 (H) 05/10/2018    LABPROT 10.9 05/10/2018    INR 1.0 05/10/2018       Diagnostics: EMR reviewed     Transplant Hepatology - Candidacy   Assessment/Plan:     Transplant Candidacy: Doni Flores is a 48 y.o. male with ESLD secondary to nonalcoholic steatohepatitis here for evaluation for possible OLT.  In my opinion, he is not a good candidate for liver transplant.  He will not be presented to selection committee after all tests and evaluations are complete.    While the patient continues to have multiple hospitalizations related to liver disease, there is still concern for transplant candidacy due to ongoing narcotic use.  He is obtaining narcotics during hospital admissions and has use even on day of clinic visit.  He also has poorly controlled DM with BG > 900.  Based on these factors, do not recommend moving forward with evaluation at this time.        MELD 11, no change in liver related medications     Hyperglycemia:  Patient was contacted and recommended for hospital admission locally given the degree of elevated BS    RTC in 3 months    PCP: Dr. Erik Baires MD         [facility] Patient Status  Functional Status: 50% - Requires considerable assistance and frequent medical care  Physical Capacity: Limited Mobility    Outside Records Request: none

## 2018-10-24 NOTE — TELEPHONE ENCOUNTER
Contacted pt's sister (emergency contact). She stated that his cell phone is his only communication method and to try calling again.     Pt contacted by phone again and directed to the ED. Pt verbalized understanding and agreement.    Provider updated in clinic.    SCC